# Patient Record
Sex: FEMALE | Race: WHITE | NOT HISPANIC OR LATINO | Employment: UNEMPLOYED | URBAN - METROPOLITAN AREA
[De-identification: names, ages, dates, MRNs, and addresses within clinical notes are randomized per-mention and may not be internally consistent; named-entity substitution may affect disease eponyms.]

---

## 2022-02-08 ENCOUNTER — TELEPHONE (OUTPATIENT)
Dept: OBGYN CLINIC | Facility: CLINIC | Age: 47
End: 2022-02-08

## 2022-02-08 DIAGNOSIS — Z12.31 ENCOUNTER FOR SCREENING MAMMOGRAM FOR MALIGNANT NEOPLASM OF BREAST: Primary | ICD-10-CM

## 2022-02-08 DIAGNOSIS — R92.2 DENSE BREAST TISSUE ON MAMMOGRAM: ICD-10-CM

## 2022-02-08 PROBLEM — R92.30 DENSE BREAST TISSUE ON MAMMOGRAM: Status: ACTIVE | Noted: 2022-02-08

## 2022-02-08 NOTE — TELEPHONE ENCOUNTER
Patient called requesting screening mammogram order and a breast ultrasound which she had to get it done last year due to dense breast  Pt request them to be mailed home so she can schedule with [facility] as she is not scheduled for [facility] until 03/2022  Dr Sahu Else mammogram order placed already, can you please put in the breast ultrasound order

## 2022-02-23 ENCOUNTER — VBI (OUTPATIENT)
Dept: ADMINISTRATIVE | Facility: OTHER | Age: 47
End: 2022-02-23

## 2022-02-23 NOTE — TELEPHONE ENCOUNTER
Upon review of the In Basket request we were able to locate, review, and update the patient chart as requested for Pap Smear (HPV) aka Cervical Cancer Screening  Any additional questions or concerns should be emailed to the Practice Liaisons via [email or web address] email, please do not reply via In Basket      Thank you  Jennifer Rubio

## 2022-05-06 ENCOUNTER — ANNUAL EXAM (OUTPATIENT)
Dept: OBGYN CLINIC | Facility: CLINIC | Age: 47
End: 2022-05-06
Payer: COMMERCIAL

## 2022-05-06 VITALS
DIASTOLIC BLOOD PRESSURE: 78 MMHG | WEIGHT: 132.4 LBS | BODY MASS INDEX: 21.28 KG/M2 | SYSTOLIC BLOOD PRESSURE: 130 MMHG | HEIGHT: 66 IN

## 2022-05-06 DIAGNOSIS — Z30.09 COUNSELING FOR BIRTH CONTROL REGARDING INTRAUTERINE DEVICE (IUD): ICD-10-CM

## 2022-05-06 DIAGNOSIS — R92.2 DENSE BREAST TISSUE ON MAMMOGRAM: ICD-10-CM

## 2022-05-06 DIAGNOSIS — Z12.31 ENCOUNTER FOR SCREENING MAMMOGRAM FOR MALIGNANT NEOPLASM OF BREAST: ICD-10-CM

## 2022-05-06 DIAGNOSIS — Z01.419 ENCOUNTER FOR ANNUAL ROUTINE GYNECOLOGICAL EXAMINATION: Primary | ICD-10-CM

## 2022-05-06 PROCEDURE — S0612 ANNUAL GYNECOLOGICAL EXAMINA: HCPCS | Performed by: OBSTETRICS & GYNECOLOGY

## 2022-05-06 RX ORDER — SUMATRIPTAN 100 MG/1
TABLET, FILM COATED ORAL
COMMUNITY
Start: 2022-04-30

## 2022-05-06 NOTE — PROGRESS NOTES
Annual Wellness Visit  [address]   [address], [address], [facility]    ASSESSMENT/PLAN: Alexandria Mccracken is a 55 y o   who presents for annual gynecologic exam     Encounter for routine gynecologic examination  - Routine well woman exam completed today  - Cervical Cancer Screening: Current ASCCP Guidelines reviewed  Last Pap: 2021 normal  Next Pap Due: 2 years, routine   - STI screening offered including HIV testing: offered, pt declined  - Contraceptive counseling discussed  Current contraception: IUD,   - Breast Cancer Screening: Last Mammogram 03/10/2022, normal  - The following were reviewed in today's visit: mammography screening ordered, family planning choices, exercise and healthy diet    Additional problems addressed during this visit:  1  Encounter for annual routine gynecological examination    2  Encounter for screening mammogram for malignant neoplasm of breast  -     Mammo screening bilateral w 3d & cad; Future; Expected date: 2023  -     US breast screening bilateral complete (ABUS); Future    3  Dense breast tissue on mammogram  Assessment & Plan:  History extremely dense breasts  Gets bilateral u/s in addition to mammograms  Encouraged to check insurance coverage of u/s  Orders:  -     US breast screening bilateral complete (ABUS); Future    4  Counseling for birth control regarding intrauterine device (IUD)  Comments:  Mirena IUD placed 2016 - now FDA approved for 7 years  A/P:   Patient happy with IUD  Discussed now FDA approved for 7 years, not due for removal or replacement until 2023  Discussed considering removal and see if periods return or replacement at that time  She will likely have replaced  Next visit: 1 year Wellness      CC:  Annual Gynecologic Examination    HPI: Alexandria Mccracken is a 55 y o   who presents for annual gynecologic examination    She denies any breast, urinary or pelvic issues at today's visit  Has Mirena IUD  No periods  Does get migraines when would get menses - takes Imitrex now and much better  Gyn History  No LMP recorded  (Menstrual status: Birth Control)  Last Pap: 2021 was normal    She  reports being sexually active and has had partner(s) who are male  She reports using the following method of birth control/protection: I U D        OB History      Past Medical History:  No date: Migraines     Past Surgical History:  2016: INSERTION OF INTRAUTERINE DEVICE (IUD)     Family History   Problem Relation Age of Onset    Migraines Sister     Breast cancer Neg Hx     Uterine cancer Neg Hx     Ovarian cancer Neg Hx     Colon cancer Neg Hx         Social History     Tobacco Use    Smoking status: Never Smoker    Smokeless tobacco: Never Used   Vaping Use    Vaping Use: Never used   Substance Use Topics    Alcohol use: Yes     Alcohol/week: 0 0 standard drinks     Comment: rarely drink    Drug use: Never          Current Outpatient Medications:     Levonorgestrel (MIRENA) 20 MCG/24HR IUD, 1 each by Intrauterine route once, Disp: , Rfl:     SUMAtriptan (IMITREX) 100 mg tablet, TAKE ONE TABLET BY MOUTH AT ONSET of HEADACHE, MAY REPEAT AFTER 4 hours, Disp: , Rfl:     She has No Known Allergies       ROS negative except as noted in HPI    Objective:  /78   Ht 5' 6" (1 676 m)   Wt 60 1 kg (132 lb 6 4 oz)   Breastfeeding No   BMI 21 37 kg/m²      Physical Exam  Constitutional:       Appearance: Normal appearance  Chest:   Breasts:      Right: Normal  No mass, tenderness or axillary adenopathy  Left: Normal  No mass, tenderness or axillary adenopathy  Abdominal:      Palpations: Abdomen is soft  Tenderness: There is no abdominal tenderness  Genitourinary:     General: Normal vulva  Vagina: No bleeding or lesions  Cervix: Normal       Uterus: Normal  Not tender  Adnexa:         Right: No mass or tenderness            Left: No mass or tenderness  Rectum: No external hemorrhoid  Comments: IUD string end palpated but not visualized  Musculoskeletal:         General: Normal range of motion  Lymphadenopathy:      Upper Body:      Right upper body: No axillary adenopathy  Left upper body: No axillary adenopathy  Neurological:      Mental Status: She is alert and oriented to person, place, and time     Psychiatric:         Mood and Affect: Mood normal          Behavior: Behavior normal

## 2022-05-06 NOTE — LETTER
May 6, 2022     Melia Ch MD  [address] [facility] 1    Patient: Logan Ramsey   YOB: 1975   Date of Visit: 2022       Dear Dr Hollins Cleaves: Thank you for referring Logan Ramsey to me for evaluation  Below are my notes for this consultation  If you have questions, please do not hesitate to call me  I look forward to following your patient along with you  Sincerely,        Gretchen Holguin MD        CC: No Recipients  Gretchen Holguin MD  2022  1:23 PM  Sign when Signing Visit  Annual Wellness Visit  [address]   [address] [facility], [address], [facility], [facility] 1    ASSESSMENT/PLAN: Logan Ramsey is a 55 y o   who presents for annual gynecologic exam     Encounter for routine gynecologic examination  - Routine well woman exam completed today  - Cervical Cancer Screening: Current ASCCP Guidelines reviewed  Last Pap: 2021 normal  Next Pap Due: 2 years, routine   - STI screening offered including HIV testing: offered, pt declined  - Contraceptive counseling discussed  Current contraception: IUD,   - Breast Cancer Screening: Last Mammogram 03/10/2022, normal  - The following were reviewed in today's visit: mammography screening ordered, family planning choices, exercise and healthy diet    Additional problems addressed during this visit:  1  Encounter for annual routine gynecological examination    2  Encounter for screening mammogram for malignant neoplasm of breast  -     Mammo screening bilateral w 3d & cad; Future; Expected date: 2023  -     US breast screening bilateral complete (ABUS); Future    3  Dense breast tissue on mammogram  Assessment & Plan:  History extremely dense breasts  Gets bilateral u/s in addition to mammograms  Encouraged to check insurance coverage of u/s  Orders:  -     US breast screening bilateral complete (ABUS); Future    4   Counseling for birth control regarding intrauterine device (IUD)  Comments:  Mirena IUD placed 2016 - now FDA approved for 7 years  A/P:   Patient happy with IUD  Discussed now FDA approved for 7 years, not due for removal or replacement until 2023  Discussed considering removal and see if periods return or replacement at that time  She will likely have replaced  Next visit: 1 year Wellness      CC:  Annual Gynecologic Examination    HPI: Rosendo Covington is a 55 y o   who presents for annual gynecologic examination  She denies any breast, urinary or pelvic issues at today's visit  Has Mirena IUD  No periods  Does get migraines when would get menses - takes Imitrex now and much better  Gyn History  No LMP recorded  (Menstrual status: Birth Control)  Last Pap: 2021 was normal    She  reports being sexually active and has had partner(s) who are male  She reports using the following method of birth control/protection: I U D        OB History      Past Medical History:  No date: Migraines     Past Surgical History:  2016: INSERTION OF INTRAUTERINE DEVICE (IUD)     Family History   Problem Relation Age of Onset    Migraines Sister     Breast cancer Neg Hx     Uterine cancer Neg Hx     Ovarian cancer Neg Hx     Colon cancer Neg Hx         Social History     Tobacco Use    Smoking status: Never Smoker    Smokeless tobacco: Never Used   Vaping Use    Vaping Use: Never used   Substance Use Topics    Alcohol use: Yes     Alcohol/week: 0 0 standard drinks     Comment: rarely drink    Drug use: Never          Current Outpatient Medications:     Levonorgestrel (MIRENA) 20 MCG/24HR IUD, 1 each by Intrauterine route once, Disp: , Rfl:     SUMAtriptan (IMITREX) 100 mg tablet, TAKE ONE TABLET BY MOUTH AT ONSET of HEADACHE, MAY REPEAT AFTER 4 hours, Disp: , Rfl:     She has No Known Allergies       ROS negative except as noted in HPI    Objective:  /78   Ht 5' 6" (1 676 m)   Wt 60 1 kg (132 lb 6 4 oz)   Breastfeeding No   BMI 21 37 kg/m²      Physical Exam  Constitutional:       Appearance: Normal appearance  Chest:   Breasts:      Right: Normal  No mass, tenderness or axillary adenopathy  Left: Normal  No mass, tenderness or axillary adenopathy  Abdominal:      Palpations: Abdomen is soft  Tenderness: There is no abdominal tenderness  Genitourinary:     General: Normal vulva  Vagina: No bleeding or lesions  Cervix: Normal       Uterus: Normal  Not tender  Adnexa:         Right: No mass or tenderness  Left: No mass or tenderness  Rectum: No external hemorrhoid  Comments: IUD string end palpated but not visualized  Musculoskeletal:         General: Normal range of motion  Lymphadenopathy:      Upper Body:      Right upper body: No axillary adenopathy  Left upper body: No axillary adenopathy  Neurological:      Mental Status: She is alert and oriented to person, place, and time     Psychiatric:         Mood and Affect: Mood normal          Behavior: Behavior normal

## 2022-05-06 NOTE — ASSESSMENT & PLAN NOTE
History extremely dense breasts  Gets bilateral u/s in addition to mammograms  Encouraged to check insurance coverage of u/s

## 2023-04-12 DIAGNOSIS — Z12.31 ENCOUNTER FOR SCREENING MAMMOGRAM FOR MALIGNANT NEOPLASM OF BREAST: ICD-10-CM

## 2023-06-23 ENCOUNTER — ANNUAL EXAM (OUTPATIENT)
Dept: OBGYN CLINIC | Facility: CLINIC | Age: 48
End: 2023-06-23
Payer: COMMERCIAL

## 2023-06-23 ENCOUNTER — PATIENT MESSAGE (OUTPATIENT)
Dept: OBGYN CLINIC | Facility: CLINIC | Age: 48
End: 2023-06-23

## 2023-06-23 VITALS
SYSTOLIC BLOOD PRESSURE: 110 MMHG | BODY MASS INDEX: 19.09 KG/M2 | HEIGHT: 66 IN | WEIGHT: 118.8 LBS | DIASTOLIC BLOOD PRESSURE: 62 MMHG

## 2023-06-23 DIAGNOSIS — Z01.419 ENCOUNTER FOR ANNUAL ROUTINE GYNECOLOGICAL EXAMINATION: Primary | ICD-10-CM

## 2023-06-23 DIAGNOSIS — Z12.11 SCREENING FOR COLON CANCER: Primary | ICD-10-CM

## 2023-06-23 DIAGNOSIS — Z12.31 BREAST CANCER SCREENING BY MAMMOGRAM: ICD-10-CM

## 2023-06-23 DIAGNOSIS — R92.2 DENSE BREAST TISSUE ON MAMMOGRAM: ICD-10-CM

## 2023-06-23 PROCEDURE — S0612 ANNUAL GYNECOLOGICAL EXAMINA: HCPCS | Performed by: OBSTETRICS & GYNECOLOGY

## 2023-06-23 RX ORDER — UBROGEPANT 100 MG/1
TABLET ORAL
COMMUNITY
Start: 2023-05-16

## 2023-06-23 RX ORDER — TOPIRAMATE 100 MG/1
TABLET, FILM COATED ORAL
COMMUNITY
Start: 2023-04-14 | End: 2023-06-23

## 2023-06-23 RX ORDER — TOPIRAMATE 50 MG/1
3 TABLET, FILM COATED ORAL DAILY
COMMUNITY
Start: 2023-05-16

## 2023-06-23 NOTE — LETTER
[id number], [address] [facility] [address]    Patient: Pita Dorsey   YOB: 1975   Date of Visit: 2023       Dear Dr Pedro Esposito: Thank you for referring Pita Dorsey to me for evaluation  Below are my notes for this consultation  If you have questions, please do not hesitate to call me  I look forward to following your patient along with you  Sincerely,        Harper Arthur MD        CC: No Recipients    Harper Arthur MD  2023  9:25 AM  Sign when Signing Visit  Annual Wellness Visit  [address]   [address] [facility], [address], [facility], [facility] 1    ASSESSMENT/PLAN: Pita Dorsey is a 50 y o   who presents for annual gynecologic exam     Encounter for routine gynecologic examination  - Routine well woman exam completed today  - Cervical Cancer Screening: Current ASCCP Guidelines reviewed  Last Pap: 2021 normal  Next Pap Due: next year, routine   - STI screening offered including HIV testing: offered, pt declined  - Contraceptive counseling discussed  Current contraception: IUD, placed 2016 - effective until 2024  - Breast Cancer Screening: Last Mammogram 2023, normal  - The following were reviewed in today's visit: mammography screening ordered, menopause, exercise and healthy diet    Additional problems addressed during this visit:  1  Encounter for annual routine gynecological examination    2  Breast cancer screening by mammogram  -     Mammo screening bilateral w 3d & cad; Future    3  Dense breast tissue on mammogram  Assessment & Plan:  Reviewed breast density with patient  Previously >75% dense  Most recently 50-75% dense  Discussed this does decrease with age sometimes  Pt wishes to continue ultrasound screening - states it was done in 3/2023 w/ mammogram although we do not have report  Gave order, encouraged to check coverage with insurance      Orders:  -     Mammo screening bilateral w 3d & cad; Future  -     US breast screening bilateral complete (ABUS); Future      Next visit: 1 year Wellness      CC:  Annual Gynecologic Examination    HPI: Marquise To is a 50 y o   who presents for annual gynecologic examination  She denies any breast, urinary or pelvic issues at today's visit  Has IUD Mirena - spotting occasionally  Some night sweats  Sexually active, no new partners  Gyn History  No LMP recorded  Patient has had an implant  Last Pap: 2021 was normal    She  reports being sexually active and has had partner(s) who are male   She reports using the following method of birth control/protection: I U D        OB History      Past Medical History:  No date: Migraines      Comment:  saw neurology - Dr Mackenzie Pollock     Past Surgical History:  2016: [place] (IUD)     Family History   Problem Relation Age of Onset   • Hypertension Mother    • Hyperlipidemia Father    • Migraines Sister    • No Known Problems Sister    • No Known Problems Brother    • No Known Problems Daughter    • No Known Problems Son    • No Known Problems Son    • No Known Problems Son    • Stroke Maternal Grandmother    • No Known Problems Maternal Grandfather    • Alzheimer's disease Paternal Grandmother    • Alzheimer's disease Paternal Grandfather    • Breast cancer Neg Hx    • Uterine cancer Neg Hx    • Ovarian cancer Neg Hx    • Colon cancer Neg Hx         Social History     Tobacco Use   • Smoking status: Never   • Smokeless tobacco: Never   Vaping Use   • Vaping Use: Never used   Substance Use Topics   • Alcohol use: Not Currently     Comment: rarely drink   • Drug use: Never          Current Outpatient Medications:   •  Levonorgestrel (MIRENA) 20 MCG/24HR IUD, 1 each by Intrauterine route once, Disp: , Rfl:   •  topiramate (TOPAMAX) 50 MG tablet, Take 3 tablets by mouth daily, Disp: , Rfl:   •  Ubrelvy 100 MG tablet, Take 100 mg by mouth if needed "(migraine)  1 at onset and may repeat x1 in 2 hours; max= 2 in 24 hours, Disp: , Rfl:     She has No Known Allergies       ROS negative except as noted in HPI    Objective:  /62 (BP Location: Left arm, Patient Position: Sitting, Cuff Size: Standard)   Ht 5' 6 25\" (1 683 m)   Wt 53 9 kg (118 lb 12 8 oz)   BMI 19 03 kg/m²     Physical Exam  Constitutional:       Appearance: Normal appearance  Chest:   Breasts:     Right: Normal  No mass or tenderness  Left: Normal  No mass or tenderness  Abdominal:      Palpations: Abdomen is soft  Tenderness: There is no abdominal tenderness  Genitourinary:     General: Normal vulva  Vagina: No bleeding or lesions  Cervix: Normal       Uterus: Normal  Not tender  Adnexa:         Right: No mass or tenderness  Left: No mass or tenderness  Rectum: No external hemorrhoid  Comments: IUD string palpated but not visualized  Musculoskeletal:         General: Normal range of motion  Lymphadenopathy:      Upper Body:      Right upper body: No axillary adenopathy  Left upper body: No axillary adenopathy  Neurological:      Mental Status: She is alert and oriented to person, place, and time     Psychiatric:         Mood and Affect: Mood normal          Behavior: Behavior normal           "

## 2023-06-23 NOTE — ASSESSMENT & PLAN NOTE
Reviewed breast density with patient  Previously >75% dense  Most recently 50-75% dense  Discussed this does decrease with age sometimes  Pt wishes to continue ultrasound screening - states it was done in 3/2023 w/ mammogram although we do not have report  Gave order, encouraged to check coverage with insurance

## 2023-06-23 NOTE — PROGRESS NOTES
Annual Wellness Visit  [address]   [address], [address], [place], [place] [address]    ASSESSMENT/PLAN: Ignacio Maloney is a 50 y o   who presents for annual gynecologic exam     Encounter for routine gynecologic examination  - Routine well woman exam completed today  - Cervical Cancer Screening: Current ASCCP Guidelines reviewed  Last Pap: 2021 normal  Next Pap Due: next year, routine   - STI screening offered including HIV testing: offered, pt declined  - Contraceptive counseling discussed  Current contraception: IUD, placed 2016 - effective until 2024  - Breast Cancer Screening: Last Mammogram 2023, normal  - The following were reviewed in today's visit: mammography screening ordered, menopause, exercise and healthy diet    Additional problems addressed during this visit:  1  Encounter for annual routine gynecological examination    2  Breast cancer screening by mammogram  -     Mammo screening bilateral w 3d & cad; Future    3  Dense breast tissue on mammogram  Assessment & Plan:  Reviewed breast density with patient  Previously >75% dense  Most recently 50-75% dense  Discussed this does decrease with age sometimes  Pt wishes to continue ultrasound screening - states it was done in 3/2023 w/ mammogram although we do not have report  Gave order, encouraged to check coverage with insurance  Orders:  -     Mammo screening bilateral w 3d & cad; Future  -     US breast screening bilateral complete (ABUS); Future      Next visit: 1 year Wellness      CC:  Annual Gynecologic Examination    HPI: Ignacio Maloney is a 50 y o   who presents for annual gynecologic examination  She denies any breast, urinary or pelvic issues at today's visit  Has IUD Mirena - spotting occasionally  Some night sweats  Sexually active, no new partners  Gyn History  No LMP recorded  Patient has had an implant       Last Pap: 2021 was normal    She  reports being "sexually active and has had partner(s) who are male  She reports using the following method of birth control/protection: I U D        OB History      Past Medical History:  No date: Migraines      Comment:  saw neurology - Dr La Karimi     Past Surgical History:  2016: [place] (IUD)     Family History   Problem Relation Age of Onset   • Hypertension Mother    • Hyperlipidemia Father    • Migraines Sister    • No Known Problems Sister    • No Known Problems Brother    • No Known Problems Daughter    • No Known Problems Son    • No Known Problems Son    • No Known Problems Son    • Stroke Maternal Grandmother    • No Known Problems Maternal Grandfather    • Alzheimer's disease Paternal Grandmother    • Alzheimer's disease Paternal Grandfather    • Breast cancer Neg Hx    • Uterine cancer Neg Hx    • Ovarian cancer Neg Hx    • Colon cancer Neg Hx         Social History     Tobacco Use   • Smoking status: Never   • Smokeless tobacco: Never   Vaping Use   • Vaping Use: Never used   Substance Use Topics   • Alcohol use: Not Currently     Comment: rarely drink   • Drug use: Never          Current Outpatient Medications:   •  Levonorgestrel (MIRENA) 20 MCG/24HR IUD, 1 each by Intrauterine route once, Disp: , Rfl:   •  topiramate (TOPAMAX) 50 MG tablet, Take 3 tablets by mouth daily, Disp: , Rfl:   •  Ubrelvy 100 MG tablet, Take 100 mg by mouth if needed (migraine)  1 at onset and may repeat x1 in 2 hours; max= 2 in 24 hours, Disp: , Rfl:     She has No Known Allergies       ROS negative except as noted in HPI    Objective:  /62 (BP Location: Left arm, Patient Position: Sitting, Cuff Size: Standard)   Ht 5' 6 25\" (1 683 m)   Wt 53 9 kg (118 lb 12 8 oz)   BMI 19 03 kg/m²      Physical Exam  Constitutional:       Appearance: Normal appearance  Chest:   Breasts:     Right: Normal  No mass or tenderness  Left: Normal  No mass or tenderness     Abdominal:      Palpations: Abdomen is " soft       Tenderness: There is no abdominal tenderness  Genitourinary:     General: Normal vulva  Vagina: No bleeding or lesions  Cervix: Normal       Uterus: Normal  Not tender  Adnexa:         Right: No mass or tenderness  Left: No mass or tenderness  Rectum: No external hemorrhoid  Comments: IUD string palpated but not visualized  Musculoskeletal:         General: Normal range of motion  Lymphadenopathy:      Upper Body:      Right upper body: No axillary adenopathy  Left upper body: No axillary adenopathy  Neurological:      Mental Status: She is alert and oriented to person, place, and time     Psychiatric:         Mood and Affect: Mood normal          Behavior: Behavior normal

## 2023-07-27 LAB — COLOGUARD RESULT REPORTABLE: NEGATIVE

## 2024-02-15 ENCOUNTER — PROCEDURE VISIT (OUTPATIENT)
Dept: OBGYN CLINIC | Facility: CLINIC | Age: 49
End: 2024-02-15
Payer: COMMERCIAL

## 2024-02-15 VITALS — SYSTOLIC BLOOD PRESSURE: 110 MMHG | DIASTOLIC BLOOD PRESSURE: 70 MMHG | WEIGHT: 126 LBS | BODY MASS INDEX: 20.18 KG/M2

## 2024-02-15 DIAGNOSIS — Z30.433 ENCOUNTER FOR REMOVAL AND REINSERTION OF INTRAUTERINE CONTRACEPTIVE DEVICE (IUD): Primary | ICD-10-CM

## 2024-02-15 PROCEDURE — 58300 INSERT INTRAUTERINE DEVICE: CPT | Performed by: OBSTETRICS & GYNECOLOGY

## 2024-02-15 PROCEDURE — 58301 REMOVE INTRAUTERINE DEVICE: CPT | Performed by: OBSTETRICS & GYNECOLOGY

## 2024-02-15 NOTE — PROGRESS NOTES
[facility] [address], [address], [place] [address]    Assessment/Plan:  Mani is a 48 y.o. year old  who presents for IUD replacement.  1. Encounter for removal and reinsertion of intrauterine contraceptive device (IUD)  A/P:   IUD reviewed w/ patient, all questions answered.  Procedure process discussed and pt consented.  Discussed normal irregular bleeding, cramping.  Call if fever, severe pain not improved w/ OTC analgesia or bleeding heavier than a period.  Discussed how to check string.  Return in 4 weeks for string check  -     levonorgestrel (MIRENA) IUD 20 mcg/day        Next Exam: 4 weeks string check    Subjective:     HPI: Mani Bruce is a 48 y.o. .  Patient presents for replacement of Mirena IUD.  She had placed 2016.  She was doing well but over the last year having more bleeding than previously.  Here for replacement as has been in place for > 5 years.          The following portions of the patient's history were reviewed and updated as appropriate: allergies, current medications, past family history, past medical history, obstetric history, gynecologic history, past social history, past surgical history and problem list.    ROS: Review of Systems   Constitutional: Negative.    Gastrointestinal: Negative.    Genitourinary: Negative.    Psychiatric/Behavioral: Negative.           Current Outpatient Medications:     topiramate (TOPAMAX) 50 MG tablet, Take 3 tablets by mouth daily, Disp: , Rfl:     Ubrelvy 100 MG tablet, Take 100 mg by mouth if needed (migraine). 1 at onset and may repeat x1 in 2 hours; max= 2 in 24 hours, Disp: , Rfl:     Current Facility-Administered Medications:     levonorgestrel (MIRENA) IUD 20 mcg/day, 1 each, Intrauterine, Once every 8 years,     Objective:  /70   Wt 57.2 kg (126 lb)   LMP 2024 (Exact Date)   BMI 20.18 kg/m²      Physical Exam  Constitutional:       Appearance: Normal appearance.    Genitourinary:     General: Normal vulva.      Vagina: Normal.      Cervix: Normal.      Uterus: Normal. Not enlarged and not tender.       Adnexa:         Right: No mass or tenderness.          Left: No mass or tenderness.        Rectum: No external hemorrhoid.      Comments: IUD string not seen initially  Neurological:      Mental Status: She is alert.   Psychiatric:         Mood and Affect: Mood normal.         Behavior: Behavior normal.           Iud removal    Date/Time: 2/15/2024 8:20 AM    Performed by: Jessica Arteaga MD  Authorized by: Jessica Arteaga MD  Universal Protocol:  Consent: Verbal consent obtained.  Risks and benefits: risks, benefits and alternatives were discussed  Consent given by: patient  Patient understanding: patient states understanding of the procedure being performed  Patient identity confirmed: verbally with patient    Procedure:     Removed with no complications: yes    Comments:      String not seen - teased down with IUD hook and then grasped with ring forcept and removed intact with difficulty  Iud insertions    Date/Time: 2/15/2024 8:20 AM    Performed by: Jessica Arteaga MD  Authorized by: Jessica Arteaga MD    Verbal consent obtained?: Yes    Written consent obtained?: Yes    Consent given by:  Patient  Patient states understanding of procedure being performed: Yes    Patient identity confirmed:  Verbally with patient  Procedure:     Pelvic exam performed: yes      Negative urine pregnancy test: no (pt with effective IUD in place)      Cervix cleaned and prepped: yes      Speculum placed in vagina: yes      Tenaculum applied to cervix: yes      Uterus sounded: yes      Uterus sound depth (cm):  8    IUD inserted with no complications: yes      IUD type:  Mirena    Strings trimmed: yes    Post-procedure:     Patient tolerated procedure well: yes      Patient will follow up after next period: yes (4-5 weeks)

## 2024-02-15 NOTE — PATIENT INSTRUCTIONS
After IUD placement:  expect irregular bleeding/spotting for 3 months.    call if severe pain, fever, bleeding heavier than a period.  for pain motrin (ibuprofen) 600mg every 6 hours.  Return in 4 weeks for string check.  If you received a progesterone IUD and it is placed within first 7 days of period, it is effective as contraception immediately, if more than 7 days since start of period - wait 7 days for it to be effective contraception.  If received a Paraguard IUD, it is effective contraception immediately.

## 2024-03-20 NOTE — PROGRESS NOTES
"[facility] [address], [address], [place] [address]    Assessment/Plan:  Mani is a 48 y.o. year old  who presents for IUD check.  1. IUD (intrauterine device) in place  Comments:  in place, no issues.        Next Exam: 2024 [facility] [place]    Subjective:     HPI: Mani Bruce is a 48 y.o. who presents for IUD check.  2/15/2024 Mirena IUD replaced by me.  Was having increase in bleeding IUD.  No complaints or issues.        The following portions of the patient's history were reviewed and updated as appropriate: allergies, current medications, past family history, past medical history, obstetric history, gynecologic history, past social history, past surgical history and problem list.    ROS: Review of Systems   Constitutional: Negative.    Gastrointestinal: Negative.    Genitourinary: Negative.    Psychiatric/Behavioral: Negative.           Current Outpatient Medications:     Levonorgestrel (MIRENA) 20 MCG/DAY IUD, 1 each by Intrauterine Device route Once every 8 years, Disp: , Rfl:     topiramate (TOPAMAX) 50 MG tablet, Take 3 tablets by mouth daily, Disp: , Rfl:     Ubrelvy 100 MG tablet, Take 100 mg by mouth if needed (migraine). 1 at onset and may repeat x1 in 2 hours; max= 2 in 24 hours, Disp: , Rfl:     Current Facility-Administered Medications:     levonorgestrel (MIRENA) IUD 20 mcg/day, 1 each, Intrauterine, Once every 8 years, , 1 Intra Uterine Device at 02/15/24 0900    Objective:  BP 90/58   Ht 5' 5.75\" (1.67 m)   Wt 57.5 kg (126 lb 12.8 oz)   Breastfeeding No   BMI 20.62 kg/m²      Physical Exam  Constitutional:       Appearance: Normal appearance.   Genitourinary:     General: Normal vulva.      Vagina: Normal.      Cervix: Normal.      Uterus: Normal. Not enlarged and not tender.       Adnexa:         Right: No mass or tenderness.          Left: No mass or tenderness.        Rectum: No external hemorrhoid.      Comments: IUD string at os  Neurological:      " Mental Status: She is alert.   Psychiatric:         Mood and Affect: Mood normal.         Behavior: Behavior normal.

## 2024-03-21 ENCOUNTER — OFFICE VISIT (OUTPATIENT)
Dept: OBGYN CLINIC | Facility: CLINIC | Age: 49
End: 2024-03-21
Payer: COMMERCIAL

## 2024-03-21 VITALS
SYSTOLIC BLOOD PRESSURE: 90 MMHG | WEIGHT: 126.8 LBS | BODY MASS INDEX: 20.38 KG/M2 | DIASTOLIC BLOOD PRESSURE: 58 MMHG | HEIGHT: 66 IN

## 2024-03-21 DIAGNOSIS — Z97.5 IUD (INTRAUTERINE DEVICE) IN PLACE: Primary | ICD-10-CM

## 2024-03-21 PROCEDURE — 99212 OFFICE O/P EST SF 10 MIN: CPT | Performed by: OBSTETRICS & GYNECOLOGY

## 2024-06-27 ENCOUNTER — ANNUAL EXAM (OUTPATIENT)
Dept: OBGYN CLINIC | Facility: CLINIC | Age: 49
End: 2024-06-27
Payer: COMMERCIAL

## 2024-06-27 VITALS
HEIGHT: 66 IN | WEIGHT: 122 LBS | BODY MASS INDEX: 19.61 KG/M2 | SYSTOLIC BLOOD PRESSURE: 104 MMHG | DIASTOLIC BLOOD PRESSURE: 64 MMHG

## 2024-06-27 DIAGNOSIS — Z12.4 CERVICAL CANCER SCREENING: ICD-10-CM

## 2024-06-27 DIAGNOSIS — Z01.419 ENCOUNTER FOR ANNUAL ROUTINE GYNECOLOGICAL EXAMINATION: Primary | ICD-10-CM

## 2024-06-27 DIAGNOSIS — Z12.31 ENCOUNTER FOR SCREENING MAMMOGRAM FOR MALIGNANT NEOPLASM OF BREAST: ICD-10-CM

## 2024-06-27 DIAGNOSIS — R92.333 HETEROGENEOUSLY DENSE TISSUE OF BOTH BREASTS ON MAMMOGRAPHY: ICD-10-CM

## 2024-06-27 PROCEDURE — S0612 ANNUAL GYNECOLOGICAL EXAMINA: HCPCS | Performed by: OBSTETRICS & GYNECOLOGY

## 2024-06-27 NOTE — LETTER
2024     ELLE Martin  [address] [address]    Patient: Mani Bruce   YOB: 1975   Date of Visit: 2024       Dear Dr. Herman:    Thank you for referring Mani Bruce to me for evaluation. Below are my notes for this consultation.    If you have questions, please do not hesitate to call me. I look forward to following your patient along with you.         Sincerely,        Jessica Arteaga MD        CC: No Recipients    Jessica Arteaga MD  2024  4:39 PM  Sign when Signing Visit  Annual Wellness Visit  [facility] - [address], [address], [place] [address]    ASSESSMENT/PLAN: Mani Bruce is a 49 y.o.  who presents for annual gynecologic exam.    Encounter for routine gynecologic examination  - Routine well woman exam completed today.  - Cervical Cancer Screening: Current ASCCP Guidelines reviewed. Last Pap: 2021 normal. Past abnormal pap: SILVIA pap .  Next Pap Due: today.  - STI screening offered including HIV testing: offered, pt declined  - Contraceptive counseling discussed.  Current contraception: IUD, Mirena placed 2/15/2024  - Breast Cancer Screening: Last Mammogram 2024 normal  - Colorectal cancer screening last Cologuard , next due .  - The following were reviewed in today's visit: mammography screening ordered, exercise, and healthy diet    Additional problems addressed during this visit:  1. Encounter for annual routine gynecological examination  2. Encounter for screening mammogram for malignant neoplasm of breast  -     Mammo screening bilateral w 3d & cad; Future  3. Cervical cancer screening  -     Thinprep Tis Pap and HPV mRNA E6/E7 Reflex HPV 16,18/45  4. Heterogeneously dense tissue of both breasts on mammography  Assessment & Plan:  Reviewed breast density with patient.  Previously >75% dense.  Most recently 50-75% dense.  Discussed this does decrease with age sometimes.   Patient wishes to continue ultrasound screening.  Encouraged to double check insurance coverage.  Order provided.  Orders:  -     US breast screening bilateral complete (ABUS); Future      Next visit: 1 year Wellness      CC:  Annual Gynecologic Examination    HPI: Mani Bruce is a 49 y.o.  who presents for annual gynecologic examination.  She denies any breast, urinary or pelvic issues at today's visit.    No spotting or periods with Mirena IUD which was replaced 2/15/2024.  Patient also notices improvement in headaches since new IUD placed.    Sexually active, no new partners.  No issues.        Gyn History  No LMP recorded. Patient has had an implant.     Last Pap: 2021 was normal    She  reports being sexually active and has had partner(s) who are male. She reports using the following method of birth control/protection: I.U.D..       OB History      Past Medical History:  No date: IUD (intrauterine device) in place      Comment:  2/15/2024 Mirena IUD placed  No date: Migraines      Comment:  saw neurology - Dr. Stein     Past Surgical History:  2016: INSERTION OF INTRAUTERINE DEVICE (IUD)     Family History   Problem Relation Age of Onset   • Hypertension Mother    • Hyperlipidemia Father    • Migraines Sister    • No Known Problems Sister    • No Known Problems Brother    • No Known Problems Daughter    • No Known Problems Son    • No Known Problems Son    • No Known Problems Son    • Stroke Maternal Grandmother    • No Known Problems Maternal Grandfather    • Alzheimer's disease Paternal Grandmother    • Alzheimer's disease Paternal Grandfather    • Breast cancer Neg Hx    • Uterine cancer Neg Hx    • Ovarian cancer Neg Hx    • Colon cancer Neg Hx         Social History     Tobacco Use   • Smoking status: Never   • Smokeless tobacco: Never   Vaping Use   • Vaping status: Never Used   Substance Use Topics   • Alcohol use: Not Currently     Comment: rarely drink   • Drug use: Never     "      Current Outpatient Medications:   •  Levonorgestrel (MIRENA) 20 MCG/DAY IUD, 1 each by Intrauterine Device route Once every 8 years, Disp: , Rfl:   •  topiramate (TOPAMAX) 50 MG tablet, Take 3 tablets by mouth daily, Disp: , Rfl:   •  Ubrelvy 100 MG tablet, Take 100 mg by mouth if needed (migraine). 1 at onset and may repeat x1 in 2 hours; max= 2 in 24 hours, Disp: , Rfl:     Current Facility-Administered Medications:   •  levonorgestrel (MIRENA) IUD 20 mcg/day, 1 each, Intrauterine, Once every 8 years, , 1 Intra Uterine Device at 02/15/24 0900    She has No Known Allergies..    ROS negative except as noted in HPI    Objective:  /64 (BP Location: Right arm, Patient Position: Sitting, Cuff Size: Standard)   Ht 5' 5.75\" (1.67 m)   Wt 55.3 kg (122 lb)   BMI 19.84 kg/m²      Physical Exam  Constitutional:       Appearance: Normal appearance.   Chest:   Breasts:     Right: Normal. No mass or tenderness.      Left: Normal. No mass or tenderness.   Abdominal:      Palpations: Abdomen is soft.      Tenderness: There is no abdominal tenderness.   Genitourinary:     General: Normal vulva.      Vagina: No bleeding or lesions.      Cervix: Normal.      Uterus: Normal. Not tender.       Adnexa:         Right: No mass or tenderness.          Left: No mass or tenderness.        Rectum: No external hemorrhoid.      Comments: IUD string at cervix  Musculoskeletal:         General: Normal range of motion.   Lymphadenopathy:      Upper Body:      Right upper body: No axillary adenopathy.      Left upper body: No axillary adenopathy.   Neurological:      Mental Status: She is alert and oriented to person, place, and time.   Psychiatric:         Mood and Affect: Mood normal.         Behavior: Behavior normal.         "

## 2024-06-27 NOTE — PROGRESS NOTES
Annual Wellness Visit  [facility] - [address], [address], [place] [address]    ASSESSMENT/PLAN: Mani Bruce is a 49 y.o.  who presents for annual gynecologic exam.    Encounter for routine gynecologic examination  - Routine well woman exam completed today.  - Cervical Cancer Screening: Current ASCCP Guidelines reviewed. Last Pap: 2021 normal. Past abnormal pap: SILVIA pap .  Next Pap Due: today.  - STI screening offered including HIV testing: offered, pt declined  - Contraceptive counseling discussed.  Current contraception: IUD, Mirena placed 2/15/2024  - Breast Cancer Screening: Last Mammogram 2024 normal  - Colorectal cancer screening last Cologuard , next due .  - The following were reviewed in today's visit: mammography screening ordered, exercise, and healthy diet    Additional problems addressed during this visit:  1. Encounter for annual routine gynecological examination  2. Encounter for screening mammogram for malignant neoplasm of breast  -     Mammo screening bilateral w 3d & cad; Future  3. Cervical cancer screening  -     Thinprep Tis Pap and HPV mRNA E6/E7 Reflex HPV 16,18/45  4. Heterogeneously dense tissue of both breasts on mammography  Assessment & Plan:  Reviewed breast density with patient.  Previously >75% dense.  Most recently 50-75% dense.  Discussed this does decrease with age sometimes.  Patient wishes to continue ultrasound screening.  Encouraged to double check insurance coverage.  Order provided.  Orders:  -     US breast screening bilateral complete (ABUS); Future      Next visit: 1 year Wellness      CC:  Annual Gynecologic Examination    HPI: Mani Bruce is a 49 y.o.  who presents for annual gynecologic examination.  She denies any breast, urinary or pelvic issues at today's visit.    No spotting or periods with Mirena IUD which was replaced 2/15/2024.  Patient also notices improvement in headaches since new IUD  placed.    Sexually active, no new partners.  No issues.        Gyn History  No LMP recorded. Patient has had an implant.     Last Pap: 2021 was normal    She  reports being sexually active and has had partner(s) who are male. She reports using the following method of birth control/protection: I.U.D..       OB History      Past Medical History:  No date: IUD (intrauterine device) in place      Comment:  2/15/2024 Mirena IUD placed  No date: Migraines      Comment:  saw neurology - Dr. Stein     Past Surgical History:  2016: INSERTION OF INTRAUTERINE DEVICE (IUD)     Family History   Problem Relation Age of Onset    Hypertension Mother     Hyperlipidemia Father     Migraines Sister     No Known Problems Sister     No Known Problems Brother     No Known Problems Daughter     No Known Problems Son     No Known Problems Son     No Known Problems Son     Stroke Maternal Grandmother     No Known Problems Maternal Grandfather     Alzheimer's disease Paternal Grandmother     Alzheimer's disease Paternal Grandfather     Breast cancer Neg Hx     Uterine cancer Neg Hx     Ovarian cancer Neg Hx     Colon cancer Neg Hx         Social History     Tobacco Use    Smoking status: Never    Smokeless tobacco: Never   Vaping Use    Vaping status: Never Used   Substance Use Topics    Alcohol use: Not Currently     Comment: rarely drink    Drug use: Never          Current Outpatient Medications:     Levonorgestrel (MIRENA) 20 MCG/DAY IUD, 1 each by Intrauterine Device route Once every 8 years, Disp: , Rfl:     topiramate (TOPAMAX) 50 MG tablet, Take 3 tablets by mouth daily, Disp: , Rfl:     Ubrelvy 100 MG tablet, Take 100 mg by mouth if needed (migraine). 1 at onset and may repeat x1 in 2 hours; max= 2 in 24 hours, Disp: , Rfl:     Current Facility-Administered Medications:     levonorgestrel (MIRENA) IUD 20 mcg/day, 1 each, Intrauterine, Once every 8 years, , 1 Intra Uterine Device at 02/15/24 0900    She has No Known  "Allergies..    ROS negative except as noted in HPI    Objective:  /64 (BP Location: Right arm, Patient Position: Sitting, Cuff Size: Standard)   Ht 5' 5.75\" (1.67 m)   Wt 55.3 kg (122 lb)   BMI 19.84 kg/m²      Physical Exam  Constitutional:       Appearance: Normal appearance.   Chest:   Breasts:     Right: Normal. No mass or tenderness.      Left: Normal. No mass or tenderness.   Abdominal:      Palpations: Abdomen is soft.      Tenderness: There is no abdominal tenderness.   Genitourinary:     General: Normal vulva.      Vagina: No bleeding or lesions.      Cervix: Normal.      Uterus: Normal. Not tender.       Adnexa:         Right: No mass or tenderness.          Left: No mass or tenderness.        Rectum: No external hemorrhoid.      Comments: IUD string at cervix  Musculoskeletal:         General: Normal range of motion.   Lymphadenopathy:      Upper Body:      Right upper body: No axillary adenopathy.      Left upper body: No axillary adenopathy.   Neurological:      Mental Status: She is alert and oriented to person, place, and time.   Psychiatric:         Mood and Affect: Mood normal.         Behavior: Behavior normal.         "

## 2024-06-28 PROBLEM — R92.333 HETEROGENEOUSLY DENSE TISSUE OF BOTH BREASTS ON MAMMOGRAPHY: Status: ACTIVE | Noted: 2022-02-08

## 2024-06-28 NOTE — ASSESSMENT & PLAN NOTE
Reviewed breast density with patient.  Previously >75% dense.  Most recently 50-75% dense.  Discussed this does decrease with age sometimes.  Patient wishes to continue ultrasound screening.  Encouraged to double check insurance coverage.  Order provided.

## 2024-07-02 LAB
CLINICAL INFO: NORMAL
CYTO CVX: NORMAL
CYTOLOGY CMNT CVX/VAG CYTO-IMP: NORMAL
DATE PREVIOUS BX: NORMAL
HPV E6+E7 MRNA CVX QL NAA+PROBE: NOT DETECTED
LMP START DATE: NORMAL
SL AMB PREV. PAP:: NORMAL
SPECIMEN SOURCE CVX/VAG CYTO: NORMAL

## 2024-09-25 LAB — HBA1C MFR BLD HPLC: 5.1 %

## 2025-02-13 ENCOUNTER — OFFICE VISIT (OUTPATIENT)
Dept: FAMILY MEDICINE CLINIC | Facility: CLINIC | Age: 50
End: 2025-02-13
Payer: COMMERCIAL

## 2025-02-13 VITALS
HEART RATE: 73 BPM | DIASTOLIC BLOOD PRESSURE: 57 MMHG | HEIGHT: 66 IN | SYSTOLIC BLOOD PRESSURE: 126 MMHG | BODY MASS INDEX: 20.7 KG/M2 | WEIGHT: 128.8 LBS | OXYGEN SATURATION: 99 %

## 2025-02-13 DIAGNOSIS — Z00.00 ANNUAL PHYSICAL EXAM: Primary | ICD-10-CM

## 2025-02-13 DIAGNOSIS — E78.2 MIXED HYPERLIPIDEMIA: ICD-10-CM

## 2025-02-13 DIAGNOSIS — Z13.29 SCREENING FOR THYROID DISORDER: ICD-10-CM

## 2025-02-13 DIAGNOSIS — Z13.6 SCREENING FOR CARDIOVASCULAR CONDITION: ICD-10-CM

## 2025-02-13 DIAGNOSIS — G43.109 MIGRAINE WITH AURA AND WITHOUT STATUS MIGRAINOSUS, NOT INTRACTABLE: ICD-10-CM

## 2025-02-13 DIAGNOSIS — Z13.1 SCREENING FOR DIABETES MELLITUS: ICD-10-CM

## 2025-02-13 DIAGNOSIS — Z13.0 SCREENING FOR DEFICIENCY ANEMIA: ICD-10-CM

## 2025-02-13 PROCEDURE — 99386 PREV VISIT NEW AGE 40-64: CPT | Performed by: FAMILY MEDICINE

## 2025-02-13 NOTE — PROGRESS NOTES
Adult Annual Physical  Name: Mani Bruce      : 1975      MRN: [id number]  Encounter Provider: Traci Michaels DO  Encounter Date: 2025   Encounter department: [facility]    Assessment & Plan  Annual physical exam  Discussed diet and exercise  Screening labs done through 's employer at [employer]. She brought copy for my review  Her mammogram is up to date  Colon cancer screen up to date  Cervical cancer screen up to date.   Thinks adacel is up to date.will obtain records.   Had flu vaccine this season       Screening for diabetes mellitus         Screening for cardiovascular condition         Screening for thyroid disorder         Screening for deficiency anemia         Migraine with aura and without status migrainosus, not intractable  Follows with [facility]  Stable on current meds       Mixed hyperlipidemia  Reviewed labs done on 24 at [facility]  Total cholesterol 221. . HDL 80  Her 10 year ASCVD risk is 0.8%.            Immunizations and preventive care screenings were discussed with patient today. Appropriate education was printed on patient's after visit summary.    Counseling:  Alcohol/drug use: discussed moderation in alcohol intake, the recommendations for healthy alcohol use, and avoidance of illicit drug use.  Dental Health: discussed importance of regular tooth brushing, flossing, and dental visits.  Injury prevention: discussed safety/seat belts, safety helmets, smoke detectors, carbon monoxide detectors, and smoking near bedding or upholstery.  Sexual health: discussed sexually transmitted diseases, partner selection, use of condoms, avoidance of unintended pregnancy, and contraceptive alternatives.  Exercise: the importance of regular exercise/physical activity was discussed. Recommend exercise 3-5 times per week for at least 30 minutes.          History of Present Illness     Adult Annual Physical:  Patient presents for annual  physical.     Diet and Physical Activity:  - Diet/Nutrition: well balanced diet. 2 servings of fruits and veggies per day. no sweetened beverages. no fast food.  - Exercise: walking and 3-4 times a week on average. and lifts weight    Depression Screening:  - PHQ-2 Score: 0    General Health:  - Sleep: sleeps well and 7-8 hours of sleep on average.  - Hearing: normal hearing right ear and normal hearing left ear.  - Vision: no vision problems, most recent eye exam < 1 year ago and wears contacts.  - Dental: regular dental visits.    /GYN Health:  - Follows with GYN: yes.   - Menopause: perimenopausal.       Patient is a 49 year old female with migraines, being seen today as a new patient to establish care.   Previous PCP=Shira ALMEIDA at [facility].   Due for physical exam.   Cervical cancer screening up to date (6/27/24). Sees Gyn  Mammogram up to date (4/30/24) has rx for mammogram this year as ordered by gyn  Colon cancer screen up to date. Had cologuard July 2023    Not sure if adacel up to date.   Had labs done through 's employer annually. Brought results for my review. Done 9/25/24  Glucose normal.   Cholesterol elevated.   Review of Systems  Medical History Reviewed by provider this encounter:  Tobacco  Allergies  Meds  Problems  Med Hx  Surg Hx  Fam Hx  Soc   Hx    .  Current Outpatient Medications on File Prior to Visit   Medication Sig Dispense Refill   • Levonorgestrel (MIRENA) 20 MCG/DAY IUD 1 each by Intrauterine Device route Once every 8 years     • topiramate (TOPAMAX) 50 MG tablet Take 3 tablets by mouth daily     • Ubrelvy 100 MG tablet Take 100 mg by mouth if needed (migraine). 1 at onset and may repeat x1 in 2 hours; max= 2 in 24 hours       Current Facility-Administered Medications on File Prior to Visit   Medication Dose Route Frequency Provider Last Rate Last Admin   • levonorgestrel (MIRENA) IUD 20 mcg/day  1 each Intrauterine Once every 8 years    1 Intra  "Uterine Device at 02/15/24 0900        Objective   /57   Pulse 73   Ht 5' 6\" (1.676 m)   Wt 58.4 kg (128 lb 12.8 oz)   SpO2 99%   BMI 20.79 kg/m²     Physical Exam  Vitals and nursing note reviewed.   Constitutional:       General: She is not in acute distress.     Appearance: Normal appearance. She is not ill-appearing, toxic-appearing or diaphoretic.   HENT:      Head: Normocephalic and atraumatic.      Right Ear: Tympanic membrane normal.      Left Ear: Tympanic membrane normal.      Nose: Nose normal.      Mouth/Throat:      Mouth: Mucous membranes are moist.      Pharynx: No posterior oropharyngeal erythema.   Eyes:      Extraocular Movements: Extraocular movements intact.      Conjunctiva/sclera: Conjunctivae normal.      Pupils: Pupils are equal, round, and reactive to light.   Cardiovascular:      Rate and Rhythm: Normal rate and regular rhythm.      Heart sounds: No murmur heard.  Pulmonary:      Effort: Pulmonary effort is normal.      Breath sounds: Normal breath sounds.   Abdominal:      General: Abdomen is flat. Bowel sounds are normal.      Palpations: Abdomen is soft.   Musculoskeletal:         General: No deformity.      Cervical back: Normal range of motion and neck supple.      Right lower leg: No edema.      Left lower leg: No edema.   Lymphadenopathy:      Cervical: No cervical adenopathy.   Skin:     General: Skin is warm and dry.      Findings: No rash.   Neurological:      General: No focal deficit present.      Mental Status: She is alert and oriented to person, place, and time.   Psychiatric:         Mood and Affect: Mood normal.     "

## 2025-02-13 NOTE — PATIENT INSTRUCTIONS
"Patient Education     Routine physical for adults   The Basics   Written by the doctors and editors at [facility]   What is a physical? -- A physical is a routine visit, or \"check-up,\" with your doctor. You might also hear it called a \"wellness visit\" or \"preventive visit.\"  During each visit, the doctor will:   Ask about your physical and mental health   Ask about your habits, behaviors, and lifestyle   Do an exam   Give you vaccines if needed   Talk to you about any medicines you take   Give advice about your health   Answer your questions  Getting regular check-ups is an important part of taking care of your health. It can help your doctor find and treat any problems you have. But it's also important for preventing health problems.  A routine physical is different from a \"sick visit.\" A sick visit is when you see a doctor because of a health concern or problem. Since physicals are scheduled ahead of time, you can think about what you want to ask the doctor.  How often should I get a physical? -- It depends on your age and health. In general, for people age 21 years and older:   If you are younger than 50 years, you might be able to get a physical every 3 years.   If you are 50 years or older, your doctor might recommend a physical every year.  If you have an ongoing health condition, like diabetes or high blood pressure, your doctor will probably want to see you more often.  What happens during a physical? -- In general, each visit will include:   Physical exam - The doctor or nurse will check your height, weight, heart rate, and blood pressure. They will also look at your eyes and ears. They will ask about how you are feeling and whether you have any symptoms that bother you.   Medicines - It's a good idea to bring a list of all the medicines you take to each doctor visit. Your doctor will talk to you about your medicines and answer any questions. Tell them if you are having any side effects that bother you. You " "should also tell them if you are having trouble paying for any of your medicines.   Habits and behaviors - This includes:   Your diet   Your exercise habits   Whether you smoke, drink alcohol, or use drugs   Whether you are sexually active   Whether you feel safe at home  Your doctor will talk to you about things you can do to improve your health and lower your risk of health problems. They will also offer help and support. For example, if you want to quit smoking, they can give you advice and might prescribe medicines. If you want to improve your diet or get more physical activity, they can help you with this, too.   Lab tests, if needed - The tests you get will depend on your age and situation. For example, your doctor might want to check your:   Cholesterol   Blood sugar   Iron level   Vaccines - The recommended vaccines will depend on your age, health, and what vaccines you already had. Vaccines are very important because they can prevent certain serious or deadly infections.   Discussion of screening - \"Screening\" means checking for diseases or other health problems before they cause symptoms. Your doctor can recommend screening based on your age, risk, and preferences. This might include tests to check for:   Cancer, such as breast, prostate, cervical, ovarian, colorectal, prostate, lung, or skin cancer   Sexually transmitted infections, such as chlamydia and gonorrhea   Mental health conditions like depression and anxiety  Your doctor will talk to you about the different types of screening tests. They can help you decide which screenings to have. They can also explain what the results might mean.   Answering questions - The physical is a good time to ask the doctor or nurse questions about your health. If needed, they can refer you to other doctors or specialists, too.  Adults older than 65 years often need other care, too. As you get older, your doctor will talk to you about:   How to prevent falling at " home   Hearing or vision tests   Memory testing   How to take your medicines safely   Making sure that you have the help and support you need at home  All topics are updated as new evidence becomes available and our peer review process is complete.  This topic retrieved from [employer] on: May 02, 2024.  Topic 386775 Version 1.0  Release: 32.4.3 - C32.122  © 2024 UpToDate, Inc. and/or its affiliates. All rights reserved.  Consumer Information Use and Disclaimer   Disclaimer: This generalized information is a limited summary of diagnosis, treatment, and/or medication information. It is not meant to be comprehensive and should be used as a tool to help the user understand and/or assess potential diagnostic and treatment options. It does NOT include all information about conditions, treatments, medications, side effects, or risks that may apply to a specific patient. It is not intended to be medical advice or a substitute for the medical advice, diagnosis, or treatment of a health care provider based on the health care provider's examination and assessment of a patient's specific and unique circumstances. Patients must speak with a health care provider for complete information about their health, medical questions, and treatment options, including any risks or benefits regarding use of medications. This information does not endorse any treatments or medications as safe, effective, or approved for treating a specific patient. UpToDate, Inc. and its affiliates disclaim any warranty or liability relating to this information or the use thereof.The use of this information is governed by the Terms of Use, available at https://www.woltersSynapse Wirelessuwer.com/en/know/clinical-effectiveness-terms. 2024© UpToDate, Inc. and its affiliates and/or licensors. All rights reserved.  Copyright   © 2024 UpToDate, Inc. and/or its affiliates. All rights reserved.

## 2025-02-13 NOTE — ASSESSMENT & PLAN NOTE
Reviewed labs done on 9/25/24 at [facility]  Total cholesterol 221. . HDL 80  Her 10 year ASCVD risk is 0.8%.

## 2025-02-14 ENCOUNTER — TELEPHONE (OUTPATIENT)
Dept: ADMINISTRATIVE | Facility: OTHER | Age: 50
End: 2025-02-14

## 2025-02-14 NOTE — TELEPHONE ENCOUNTER
Upon review of the In Basket request we have found this is a duplicate request and no further action is needed. This request was completed upon initial request, the patient chart is up to date, and this message will now be closed.    Any additional questions or concerns should be emailed to the Practice Liaisons via the appropriate education email address, please do not reply via In Basket.    Thank you  Yuridia Elizalde MA   [facility]

## 2025-02-14 NOTE — TELEPHONE ENCOUNTER
Upon review of the In Basket request we were able to note that no further action is required. The patient chart is up to date as a result of a previous request.      Any additional questions or concerns should be emailed to the Practice Liaisons via the appropriate education email address, please do not reply via In Basket.    Thank you  Yuridia Elizalde MA   [facility]

## 2025-02-14 NOTE — TELEPHONE ENCOUNTER
----- Message from Traci Michaels DO sent at 2/13/2025  1:04 PM EST -----  Regarding: care gap request  11/18/20 3:57 PM    Hello, our patient attached above has had CRC: Cologuard completed/performed. Please assist in updating the patient chart by pulling the Care Everywhere (CE) document. The date of service is 7/20/23.     Thank you,  Traci Michaels DO  [facility]

## 2025-02-14 NOTE — TELEPHONE ENCOUNTER
----- Message from Traci Michaels DO sent at 2/13/2025  1:04 PM EST -----  Regarding: care gap request  11/18/20 3:57 PM    Hello, our patient attached above has had Mammogram completed/performed. Please assist in updating the patient chart by pulling the Care Everywhere (CE) document. and pulling the document from the Media Tab. The date of service is 4/30/24.     Thank you,  Traci Michaels DO  [facility]

## 2025-07-18 ENCOUNTER — ANNUAL EXAM (OUTPATIENT)
Dept: OBGYN CLINIC | Facility: CLINIC | Age: 50
End: 2025-07-18
Payer: COMMERCIAL

## 2025-07-18 VITALS
SYSTOLIC BLOOD PRESSURE: 98 MMHG | HEIGHT: 66 IN | BODY MASS INDEX: 19.73 KG/M2 | WEIGHT: 122.8 LBS | DIASTOLIC BLOOD PRESSURE: 64 MMHG

## 2025-07-18 DIAGNOSIS — Z12.31 ENCOUNTER FOR SCREENING MAMMOGRAM FOR MALIGNANT NEOPLASM OF BREAST: ICD-10-CM

## 2025-07-18 DIAGNOSIS — T83.32XA INTRAUTERINE CONTRACEPTIVE DEVICE THREADS LOST, INITIAL ENCOUNTER: ICD-10-CM

## 2025-07-18 DIAGNOSIS — R92.333 HETEROGENEOUSLY DENSE TISSUE OF BOTH BREASTS ON MAMMOGRAPHY: ICD-10-CM

## 2025-07-18 DIAGNOSIS — Z01.419 ENCOUNTER FOR ANNUAL ROUTINE GYNECOLOGICAL EXAMINATION: Primary | ICD-10-CM

## 2025-07-18 PROCEDURE — S0612 ANNUAL GYNECOLOGICAL EXAMINA: HCPCS | Performed by: OBSTETRICS & GYNECOLOGY

## 2025-07-18 RX ORDER — NORTRIPTYLINE HYDROCHLORIDE 10 MG/1
20 CAPSULE ORAL
COMMUNITY

## 2025-07-18 NOTE — LETTER
2025     Traci Michaels DO  [address] [facility] [address]    Patient: Mani Bruce   YOB: 1975   Date of Visit: 2025       Dear Dr. Traci Michaels, :    Thank you for referring Mani Bruce to me for evaluation. Below are my notes for this consultation.    If you have questions, please do not hesitate to call me. I look forward to following your patient along with you.         Sincerely,        Jessica Arteaga MD        CC: No Recipients    Jessica Arteaga MD  2025  1:49 PM  Sign when Signing Visit  Annual Wellness Visit  [facility] - [address], [address], [place] [address]    ASSESSMENT/PLAN: Mani Bruce is a 50 y.o.  who presents for annual gynecologic exam.    Assessment & Plan  Encounter for annual routine gynecological examination  - Routine well woman exam completed today.  - Cervical Cancer Screening: Current ASCCP Guidelines reviewed. Last Pap: 2024 normal. Past abnormal pap: h/o SILVIA , f/u  normal.  Next Pap Due: 2 years.  - Contraceptive counseling discussed.  Current contraception: IUD, Mirena placed 2/15/2024  - Breast Cancer Screening: Last Mammogram 2024, normal  - Colorectal cancer screening last: Cologuard 2023, next due .  - The following were reviewed in today's visit: mammography screening ordered, exercise, and healthy diet       Encounter for screening mammogram for malignant neoplasm of breast  Already has mammogram order for imaging scheduled 10/2025 at [place].     Heterogeneously dense tissue of both breasts on mammography  Has order for u/s scheduled at time of mammo at [place].     Intrauterine contraceptive device threads lost, initial encounter  Cannot see or feel IUD string on exam.  Patient with  no periods so likely string just in cervical canal.  Recom pelvic u/s to confirm placement.  Order provided.    Orders:  •  US pelvis complete w transvaginal;  "Future      Next visit: 1 year Wellness      CC:  Annual Gynecologic Examination    HPI: Mani Bruce is a 50 y.o.  who presents for annual gynecologic examination.  She denies any breast, urinary or pelvic issues at today's visit.    Has Mirena IUD placed 2/15/2024 - occasional spotting, very rare and irregular.  Some night sweats in last year but not bad.  Sexually active, no new partners.  No issues.    Mammogram and u/s scheduled 10/2025 (has orders)      Gyn History  No LMP recorded. Patient has had an implant.     Last Pap: 2024 was normal    She  reports being sexually active and has had partner(s) who are male. She reports using the following method of birth control/protection: I.U.D..       OB History      Past Medical History:  No date: IUD (intrauterine device) in place      Comment:  2/15/2024 Mirena IUD placed  No date: Migraines      Comment:  saw neurology - Dr. Stein  2020: Shingles     Past Surgical History:  2016: INSERTION OF INTRAUTERINE DEVICE (IUD)     Family History[1]     Social History[2]     Current Medications[3]    She has no known allergies..    ROS negative except as noted in HPI    Objective:  BP 98/64 (BP Location: Left arm, Patient Position: Sitting, Cuff Size: Standard)   Ht 5' 6.25\" (1.683 m)   Wt 55.7 kg (122 lb 12.8 oz)   BMI 19.67 kg/m²      Physical Exam  Constitutional:       Appearance: Normal appearance.   Chest:   Breasts:     Right: Normal. No mass or tenderness.      Left: Normal. No mass or tenderness.   Abdominal:      Palpations: Abdomen is soft.      Tenderness: There is no abdominal tenderness.   Genitourinary:     General: Normal vulva.      Vagina: No bleeding or lesions.      Cervix: Normal.      Uterus: Normal. Not tender.       Adnexa:         Right: No mass or tenderness.          Left: No mass or tenderness.        Rectum: No external hemorrhoid.      Comments: IUD string not seen or felt on palpation " today.    Musculoskeletal:         General: Normal range of motion.   Lymphadenopathy:      Upper Body:      Right upper body: No axillary adenopathy.      Left upper body: No axillary adenopathy.     Neurological:      Mental Status: She is alert and oriented to person, place, and time.     Psychiatric:         Mood and Affect: Mood normal.         Behavior: Behavior normal.                  [1]  Family History  Problem Relation Name Age of Onset   • Hypertension Mother Sandra    • Hyperlipidemia Father Mickey    • Migraines Sister Geovanna Wilson    • No Known Problems Sister Bridgette    • No Known Problems Brother Filemon    • No Known Problems Daughter Clementina    • No Known Problems Son Wilbur    • No Known Problems Son Elroy    • No Known Problems Son Yung    • Stroke Maternal Grandmother Sandra Hull    • No Known Problems Maternal Grandfather     • Alzheimer's disease Paternal Grandmother     • Alzheimer's disease Paternal Grandfather     • Breast cancer Neg Hx     • Uterine cancer Neg Hx     • Ovarian cancer Neg Hx     • Colon cancer Neg Hx     [2]  Social History  Tobacco Use   • Smoking status: Never   • Smokeless tobacco: Never   Vaping Use   • Vaping status: Never Used   Substance Use Topics   • Alcohol use: Never     Comment: rarely drink   • Drug use: Never   [3]    Current Outpatient Medications:   •  Levonorgestrel (MIRENA) 20 MCG/DAY IUD, 1 each by Intrauterine Device route Once every 8 years, Disp: , Rfl:   •  nortriptyline (PAMELOR) 10 mg capsule, Take 20 mg by mouth daily at bedtime, Disp: , Rfl:   •  topiramate (TOPAMAX) 50 MG tablet, Take 3 tablets by mouth in the morning., Disp: , Rfl:   •  Ubrelvy 100 MG tablet, , Disp: , Rfl:     Current Facility-Administered Medications:   •  levonorgestrel (MIRENA) IUD 20 mcg/day, 1 each, Intrauterine, Once every 8 years, , 1 Intra Uterine Device at 02/15/24 0900

## 2025-07-18 NOTE — PROGRESS NOTES
Annual Wellness Visit  [facility] - [address], [address], [place] [address]    ASSESSMENT/PLAN: Mani Bruce is a 50 y.o.  who presents for annual gynecologic exam.    Assessment & Plan  Encounter for annual routine gynecological examination  - Routine well woman exam completed today.  - Cervical Cancer Screening: Current ASCCP Guidelines reviewed. Last Pap: 2024 normal. Past abnormal pap: h/o SILVIA , f/u  normal.  Next Pap Due: 2 years.  - Contraceptive counseling discussed.  Current contraception: IUD, Mirena placed 2/15/2024  - Breast Cancer Screening: Last Mammogram 2024, normal  - Colorectal cancer screening last: Cologuard 2023, next due .  - The following were reviewed in today's visit: mammography screening ordered, exercise, and healthy diet       Encounter for screening mammogram for malignant neoplasm of breast  Already has mammogram order for imaging scheduled 10/2025 at [place].     Heterogeneously dense tissue of both breasts on mammography  Has order for u/s scheduled at time of mammo at [place].     Intrauterine contraceptive device threads lost, initial encounter  Cannot see or feel IUD string on exam.  Patient with  no periods so likely string just in cervical canal.  Recom pelvic u/s to confirm placement.  Order provided.    Orders:    US pelvis complete w transvaginal; Future      Next visit: 1 year Wellness      CC:  Annual Gynecologic Examination    HPI: Mani Bruce is a 50 y.o.  who presents for annual gynecologic examination.  She denies any breast, urinary or pelvic issues at today's visit.    Has Mirena IUD placed 2/15/2024 - occasional spotting, very rare and irregular.  Some night sweats in last year but not bad.  Sexually active, no new partners.  No issues.    Mammogram and u/s scheduled 10/2025 (has orders)      Gyn History  No LMP recorded. Patient has had an implant.     Last Pap: 2024 was  "normal    She  reports being sexually active and has had partner(s) who are male. She reports using the following method of birth control/protection: I.U.D..       OB History      Past Medical History:  No date: IUD (intrauterine device) in place      Comment:  2/15/2024 Mirena IUD placed  No date: Migraines      Comment:  saw neurology - Dr. Stein  2020: Shingles     Past Surgical History:  2016: INSERTION OF INTRAUTERINE DEVICE (IUD)     Family History[1]     Social History[2]     Current Medications[3]    She has no known allergies..    ROS negative except as noted in HPI    Objective:  BP 98/64 (BP Location: Left arm, Patient Position: Sitting, Cuff Size: Standard)   Ht 5' 6.25\" (1.683 m)   Wt 55.7 kg (122 lb 12.8 oz)   BMI 19.67 kg/m²      Physical Exam  Constitutional:       Appearance: Normal appearance.   Chest:   Breasts:     Right: Normal. No mass or tenderness.      Left: Normal. No mass or tenderness.   Abdominal:      Palpations: Abdomen is soft.      Tenderness: There is no abdominal tenderness.   Genitourinary:     General: Normal vulva.      Vagina: No bleeding or lesions.      Cervix: Normal.      Uterus: Normal. Not tender.       Adnexa:         Right: No mass or tenderness.          Left: No mass or tenderness.        Rectum: No external hemorrhoid.      Comments: IUD string not seen or felt on palpation today.    Musculoskeletal:         General: Normal range of motion.   Lymphadenopathy:      Upper Body:      Right upper body: No axillary adenopathy.      Left upper body: No axillary adenopathy.     Neurological:      Mental Status: She is alert and oriented to person, place, and time.     Psychiatric:         Mood and Affect: Mood normal.         Behavior: Behavior normal.                [1]   Family History  Problem Relation Name Age of Onset    Hypertension Mother Sandra     Hyperlipidemia Father Mickey     Migraines Sister Geovanna Wilson     No Known Problems Sister " Bridgette     No Known Problems Brother Filemon     No Known Problems Daughter Clementina     No Known Problems Son Wilbur     No Known Problems Son Elroy     No Known Problems Son Yung     Stroke Maternal Grandmother Sandra Hull     No Known Problems Maternal Grandfather      Alzheimer's disease Paternal Grandmother      Alzheimer's disease Paternal Grandfather      Breast cancer Neg Hx      Uterine cancer Neg Hx      Ovarian cancer Neg Hx      Colon cancer Neg Hx     [2]   Social History  Tobacco Use    Smoking status: Never    Smokeless tobacco: Never   Vaping Use    Vaping status: Never Used   Substance Use Topics    Alcohol use: Never     Comment: rarely drink    Drug use: Never   [3]   Current Outpatient Medications:     Levonorgestrel (MIRENA) 20 MCG/DAY IUD, 1 each by Intrauterine Device route Once every 8 years, Disp: , Rfl:     nortriptyline (PAMELOR) 10 mg capsule, Take 20 mg by mouth daily at bedtime, Disp: , Rfl:     topiramate (TOPAMAX) 50 MG tablet, Take 3 tablets by mouth in the morning., Disp: , Rfl:     Ubrelvy 100 MG tablet, , Disp: , Rfl:     Current Facility-Administered Medications:     levonorgestrel (MIRENA) IUD 20 mcg/day, 1 each, Intrauterine, Once every 8 years, , 1 Intra Uterine Device at 02/15/24 0900

## 2025-07-22 ENCOUNTER — HOSPITAL ENCOUNTER (OUTPATIENT)
Age: 50
Discharge: HOME | End: 2025-07-22
Attending: OBSTETRICS & GYNECOLOGY
Payer: COMMERCIAL

## 2025-07-22 DIAGNOSIS — T83.32XA INTRAUTERINE CONTRACEPTIVE DEVICE THREADS LOST, INITIAL ENCOUNTER: ICD-10-CM

## 2025-07-22 PROCEDURE — 76830 TRANSVAGINAL US NON-OB: CPT

## 2025-07-22 PROCEDURE — 76856 US EXAM PELVIC COMPLETE: CPT

## 2025-07-31 PROBLEM — T83.32XA IUD STRINGS LOST: Status: ACTIVE | Noted: 2025-07-31
